# Patient Record
Sex: FEMALE | URBAN - METROPOLITAN AREA
[De-identification: names, ages, dates, MRNs, and addresses within clinical notes are randomized per-mention and may not be internally consistent; named-entity substitution may affect disease eponyms.]

---

## 2022-11-30 ENCOUNTER — OFFICE VISIT (OUTPATIENT)
Dept: URBAN - METROPOLITAN AREA CLINIC 3 | Facility: CLINIC | Age: 62
End: 2022-11-30

## 2022-11-30 DIAGNOSIS — H17.9 CORNEAL SCAR: Primary | ICD-10-CM

## 2022-11-30 PROCEDURE — 92002 INTRM OPH EXAM NEW PATIENT: CPT | Performed by: OPHTHALMOLOGY

## 2022-11-30 ASSESSMENT — INTRAOCULAR PRESSURE
OD: 10
OS: 9

## 2022-11-30 NOTE — IMPRESSION/PLAN
Impression: Corneal scar: H17.9. Plan: H/o LASIK OU in  [facility] with flap complications in the left eye that required 3 more surgeries. Patient here for a 2nd opinion. Has flap inflammation and persistent ingrwoth in the left flap. Recommend flap re-lift and wash n/a. Cont. [place] prednisolone 1% BID until the surgery.

## 2022-12-15 ENCOUNTER — POST-OPERATIVE VISIT (OUTPATIENT)
Dept: URBAN - METROPOLITAN AREA CLINIC 4 | Facility: CLINIC | Age: 62
End: 2022-12-15

## 2022-12-15 DIAGNOSIS — Z48.810 ENCOUNTER FOR SURGICAL AFTERCARE FOLLOWING SURGERY ON A SENSE ORGAN: Primary | ICD-10-CM

## 2022-12-15 PROCEDURE — 99024 POSTOP FOLLOW-UP VISIT: CPT | Performed by: OPHTHALMOLOGY

## 2022-12-15 NOTE — IMPRESSION/PLAN
Impression: S/P Flap Lift and wash OS - 6 Days. Encounter for surgical aftercare following surgery on a sense organ  Z48.810. Plan: POD#6 s/p Flap lift and wash left eye- healing well, BCL removed. Stop ofloxacin and cont. PF 1% BID for 2 weeks and QD for 2 weeks. Cont. Vitamin C 1,000mg PO QD for 3 weeks, cont. sunglasses protection and preservative free artificial tears Q1hour with taper of 1 hour per week. F/U in 4 weeks, sooner prn. --Advised patient to use artificial tears for comfort.

## 2023-01-11 ENCOUNTER — POST-OPERATIVE VISIT (OUTPATIENT)
Dept: URBAN - METROPOLITAN AREA CLINIC 4 | Facility: CLINIC | Age: 63
End: 2023-01-11

## 2023-01-11 DIAGNOSIS — Z48.810 ENCOUNTER FOR SURGICAL AFTERCARE FOLLOWING SURGERY ON A SENSE ORGAN: Primary | ICD-10-CM

## 2023-01-11 PROCEDURE — 99024 POSTOP FOLLOW-UP VISIT: CPT | Performed by: OPHTHALMOLOGY

## 2023-01-11 NOTE — IMPRESSION/PLAN
Impression: S/P Flap Relift and scrape OS - 33 Days. Encounter for surgical aftercare following surgery on a sense organ  Z48.810. Plan: POM#1 s/p LASIK flat re-lift and scrape Left eye - well healed, has mild recurrent in growth that is stable and not causing recurrent flap inflammation, plan to observe for now. Stop Vitamin C. Cont. sunglasses protection and preservative free artificial tears QID prn. F/U in 2 months for a final PO visit and refraction.

## 2023-03-22 ENCOUNTER — POST-OPERATIVE VISIT (OUTPATIENT)
Dept: URBAN - METROPOLITAN AREA CLINIC 4 | Facility: CLINIC | Age: 63
End: 2023-03-22

## 2023-03-22 DIAGNOSIS — Z48.810 ENCOUNTER FOR SURGICAL AFTERCARE FOLLOWING SURGERY ON A SENSE ORGAN: Primary | ICD-10-CM

## 2023-03-22 PROCEDURE — 99024 POSTOP FOLLOW-UP VISIT: CPT | Performed by: OPHTHALMOLOGY

## 2023-03-22 ASSESSMENT — INTRAOCULAR PRESSURE
OD: 11
OS: 12

## 2023-03-22 NOTE — IMPRESSION/PLAN
Impression: S/P LASIK flap lift and wash OS - 70 Days. Encounter for surgical aftercare following surgery on a sense organ  Z48.810. Plan: POM#3 s/p LASIK flap lift and wash OS eye - patient has recurrent ingrowth that appears stable and not causing flap inflammation, patient happy with results. Cont. sunglasses protection and preservative free artificial tears QID prn. F/U in 3 months for a repeat corneal check. --Advised patient to use artificial tears for comfort.

## 2023-06-20 ENCOUNTER — POST-OPERATIVE VISIT (OUTPATIENT)
Dept: URBAN - METROPOLITAN AREA CLINIC 4 | Facility: CLINIC | Age: 63
End: 2023-06-20

## 2023-06-20 DIAGNOSIS — Z48.810 ENCOUNTER FOR SURGICAL AFTERCARE FOLLOWING SURGERY ON A SENSE ORGAN: Primary | ICD-10-CM

## 2023-06-20 PROCEDURE — 99024 POSTOP FOLLOW-UP VISIT: CPT | Performed by: OPHTHALMOLOGY

## 2023-06-20 ASSESSMENT — INTRAOCULAR PRESSURE
OD: 9
OS: 9

## 2023-06-20 NOTE — IMPRESSION/PLAN
Impression: S/P LASIK flap lift and wash OS - 193 Days. Encounter for surgical aftercare following surgery on a sense organ  Z48.810. Plan: POM#6 s/p LASK flap lift and wash OS - patient has trace epithelial in-growth in the LASIK flap that is not visually significant, patient happy with results. Cont. sunglasses protection and artificial tears prn. Advised patient that if she experiences pain, discomfort, or a change in the vision, to return and well will perform the re-lift and wash to remove those cells. RTC in 3 months for follow up.

## 2023-09-20 ENCOUNTER — POST-OPERATIVE VISIT (OUTPATIENT)
Dept: URBAN - METROPOLITAN AREA CLINIC 4 | Facility: CLINIC | Age: 63
End: 2023-09-20

## 2023-09-20 DIAGNOSIS — Z48.810 ENCOUNTER FOR SURGICAL AFTERCARE FOLLOWING SURGERY ON A SENSE ORGAN: Primary | ICD-10-CM

## 2023-09-20 PROCEDURE — 99024 POSTOP FOLLOW-UP VISIT: CPT | Performed by: OPHTHALMOLOGY

## 2023-09-20 ASSESSMENT — INTRAOCULAR PRESSURE
OS: 11
OD: 10

## 2023-09-20 ASSESSMENT — KERATOMETRY
OS: 47.00
OD: 48.00

## 2023-12-04 ENCOUNTER — POST-OPERATIVE VISIT (OUTPATIENT)
Dept: URBAN - METROPOLITAN AREA CLINIC 4 | Facility: CLINIC | Age: 63
End: 2023-12-04

## 2023-12-04 PROCEDURE — 99024 POSTOP FOLLOW-UP VISIT: CPT | Performed by: OPHTHALMOLOGY

## 2023-12-04 ASSESSMENT — INTRAOCULAR PRESSURE
OS: 11
OD: 11

## 2023-12-04 ASSESSMENT — KERATOMETRY
OD: 47.88
OS: 46.00

## 2023-12-08 ENCOUNTER — REFRACTIVE (OUTPATIENT)
Dept: URBAN - METROPOLITAN AREA CLINIC 4 | Facility: CLINIC | Age: 63
End: 2023-12-08

## 2023-12-08 PROCEDURE — 66999 UNLISTED PX ANT SEGMENT EYE: CPT | Performed by: OPHTHALMOLOGY

## 2023-12-11 ENCOUNTER — POST-OPERATIVE VISIT (OUTPATIENT)
Dept: URBAN - METROPOLITAN AREA CLINIC 4 | Facility: CLINIC | Age: 63
End: 2023-12-11

## 2023-12-11 PROCEDURE — 99024 POSTOP FOLLOW-UP VISIT: CPT | Performed by: OPHTHALMOLOGY

## 2024-01-10 ENCOUNTER — POST-OPERATIVE VISIT (OUTPATIENT)
Dept: URBAN - METROPOLITAN AREA CLINIC 4 | Facility: CLINIC | Age: 64
End: 2024-01-10

## 2024-01-10 DIAGNOSIS — H52.13 MYOPIA, BILATERAL: ICD-10-CM

## 2024-01-10 PROCEDURE — 92015 DETERMINE REFRACTIVE STATE: CPT | Performed by: OPHTHALMOLOGY

## 2024-01-10 PROCEDURE — 99024 POSTOP FOLLOW-UP VISIT: CPT | Performed by: OPHTHALMOLOGY

## 2024-01-10 ASSESSMENT — VISUAL ACUITY
OS: 20/40
OD: 20/25

## 2024-01-12 ENCOUNTER — POST-OPERATIVE VISIT (OUTPATIENT)
Dept: URBAN - METROPOLITAN AREA CLINIC 4 | Facility: CLINIC | Age: 64
End: 2024-01-12

## 2024-01-12 DIAGNOSIS — Z48.810 ENCOUNTER FOR SURGICAL AFTERCARE FOLLOWING SURGERY ON A SENSE ORGAN: Primary | ICD-10-CM

## 2024-01-12 PROCEDURE — 99024 POSTOP FOLLOW-UP VISIT: CPT | Performed by: OPHTHALMOLOGY

## 2024-01-12 ASSESSMENT — KERATOMETRY
OS: 49.88
OD: 48.00

## 2024-01-12 ASSESSMENT — INTRAOCULAR PRESSURE
OD: 13
OS: 12

## 2024-02-14 ENCOUNTER — POST-OPERATIVE VISIT (OUTPATIENT)
Dept: URBAN - METROPOLITAN AREA CLINIC 4 | Facility: CLINIC | Age: 64
End: 2024-02-14

## 2024-02-14 PROCEDURE — 99024 POSTOP FOLLOW-UP VISIT: CPT | Performed by: OPHTHALMOLOGY

## 2024-02-14 ASSESSMENT — KERATOMETRY
OS: 49.75
OD: 48.00